# Patient Record
Sex: MALE | Race: BLACK OR AFRICAN AMERICAN
[De-identification: names, ages, dates, MRNs, and addresses within clinical notes are randomized per-mention and may not be internally consistent; named-entity substitution may affect disease eponyms.]

---

## 2018-03-22 ENCOUNTER — HOSPITAL ENCOUNTER (INPATIENT)
Dept: HOSPITAL 74 - YASAS | Age: 28
LOS: 25 days | Discharge: HOME | DRG: 772 | End: 2018-04-16
Attending: PSYCHIATRY & NEUROLOGY | Admitting: PSYCHIATRY & NEUROLOGY
Payer: COMMERCIAL

## 2018-03-22 VITALS — BODY MASS INDEX: 33 KG/M2

## 2018-03-22 DIAGNOSIS — F25.9: ICD-10-CM

## 2018-03-22 DIAGNOSIS — Z91.5: ICD-10-CM

## 2018-03-22 DIAGNOSIS — F19.24: ICD-10-CM

## 2018-03-22 DIAGNOSIS — F19.282: ICD-10-CM

## 2018-03-22 DIAGNOSIS — F12.20: Primary | ICD-10-CM

## 2018-03-22 DIAGNOSIS — L30.9: ICD-10-CM

## 2018-03-22 DIAGNOSIS — G47.9: ICD-10-CM

## 2018-03-22 DIAGNOSIS — J30.2: ICD-10-CM

## 2018-03-22 DIAGNOSIS — F43.10: ICD-10-CM

## 2018-03-22 LAB
APPEARANCE UR: (no result)
BILIRUB UR STRIP.AUTO-MCNC: NEGATIVE MG/DL
COLOR UR: YELLOW
KETONES UR QL STRIP: NEGATIVE
LEUKOCYTE ESTERASE UR QL STRIP.AUTO: NEGATIVE
NITRITE UR QL STRIP: NEGATIVE
PH UR: 5 [PH] (ref 5–8)
PROT UR QL STRIP: NEGATIVE
PROT UR QL STRIP: NEGATIVE
RBC # UR STRIP: NEGATIVE /UL
SP GR UR: 1.02 (ref 1–1.03)
UROBILINOGEN UR STRIP-MCNC: 2 MG/DL (ref 0.2–1)

## 2018-03-22 PROCEDURE — HZ42ZZZ GROUP COUNSELING FOR SUBSTANCE ABUSE TREATMENT, COGNITIVE-BEHAVIORAL: ICD-10-PCS | Performed by: PSYCHIATRY & NEUROLOGY

## 2018-03-22 PROCEDURE — G0008 ADMIN INFLUENZA VIRUS VAC: HCPCS

## 2018-03-22 PROCEDURE — G0009 ADMIN PNEUMOCOCCAL VACCINE: HCPCS

## 2018-03-22 RX ADMIN — Medication SCH MG: at 21:47

## 2018-03-22 NOTE — HP
Admission [facility]


Chief Complaint: 





I am here for rehab for marijuana 


Allergies/Adverse Reactions: 


 Allergies











Allergy/AdvReac Type Severity Reaction Status Date / Time


 


pork derived (porcine) Allergy Severe Vomiting Verified 18 16:15











History of Present Illness: 


 


28 yo male with male with history of marijuana dependence. Patient reports he 

was referred by his  Zhane Mosher from the [facility].  PMHX: 

depression and bipolar. Denies suicidal or homicidal ideation, reports hx of 

suicide attempt at age 16 by hanging. Last rehab at [facility] , reports has not 

attempted other treatment programs. Patient reports no aftercare plan none at 

this time.


Exam Limitations: No Limitations





- Ebola screening


Have you been sick,other than usual withdrawal symptoms: No


Do you have a fever: No





- Review of Systems


Constitutional: Changes in sleep


EENT: reports: No Symptoms Reported


Respiratory: reports: No Symptoms reported


Cardiac: reports: No Symptoms Reported


GI: reports: No Symptoms Reported


: reports: No Symptoms Reported


Musculoskeletal: reports: No Symptoms Reported


Integumentary: reports: No Symptoms Reported


Neuro: reports: No Symptoms reported


Endocrine: reports: Excessive Sweating


Hematology: reports: No Symptoms Reported


Psychiatric: reports: Orientated x3, Anxious


Other Systems: Reviewed and Negative





Patient History





- Patient Medical History


Hx Anemia: No


Hx Asthma: No


Hx Chronic Obstructive Pulmonary Disease (COPD): No


Hx Cancer: No


Hx Cardiac Disorders: No


Hx Congestive Heart Failure: No


Hx Hypertension: No


Hx Hypercholesterolemia: No


Hx Pacemaker: No


HX Cerebrovascular Accident: No


Hx Seizures: No


Hx Dementia: No


Hx Diabetes: No


Hx Gastrointestinal Disorders: No


Hx Liver Disease: No


Hx Genitourinary Disorders: No


Hx Sexually Transmitted Disorders: No


Hx Renal Disease (ESRD): No


Hx Thyroid Disease: No


Hx Human Immunodeficiency Virus (HIV): No


Hx Hepatitis C: No


Hx Depression: Yes


Hx Suicide Attempt: Yes (at age 16 )


Hx Bipolar Disorder: Yes


Hx Schizophrenia: No





- Patient Surgical History


Past Surgical History: No


Hx Neurologic Surgery: No


Hx Cataract Extraction: No


Hx Cardiac Surgery: No


Hx Lung Surgery: No


Hx Breast Surgery: No


Hx Breast Biopsy: No


Hx Abdominal Surgery: No


Hx Appendectomy: No


Hx Cholecystectomy: No


Hx Genitourinary Surgery: No


Hx  Section: No


Hx Orthopedic Surgery: No


Anesthesia Reaction: No





- PPD History


Previous Implant?: Yes


Documented Results: Negative w/proof


Date: 14


PPD to be Administered?: Yes





- Reproductive History


Patient is a Female of Child Bearing Age (11 -55 yrs old): No





- Smoking Cessation


Smoking history: Former smoker


Have you smoked in the past 12 months: No


Cigars Per Day: 0


Hx Chewing Tobacco Use: No


Initiated information on smoking cessation: No





- Substance & Tx. History


Hx Alcohol Use: No


Hx Substance Use: Yes


Substance Use Type: Marijuana


Hx Substance Use Treatment: Yes ([facility] )





- Substances Abused


  ** Marijuana/Hashish


Route: Smoking


Frequency: Daily


Amount used: $50


Age of first use: 14


Date of Last Use: 18





Family Disease History





- Family Disease History


Family Disease History: Other: Father (alive and well ), Mother (alive, Lupus )





Admission Physical Exam [facility]





- Vital Signs


Vital Signs: 


 Vital Signs - 24 hr











  18





  14:26


 


Temperature 98.1 F


 


Pulse Rate 84


 


Respiratory 20





Rate 


 


Blood Pressure 124/76














- Physical


General Appearance: Yes: Nourished, Appropriately Dressed, Anxious


HEENTM: Yes: Normal ENT Inspection, Normocephalic, Normal Voice, Pharynx Normal

, Tm's normal


Respiratory: Yes: Chest Non-Tender, Normal Breath Sounds, No Respiratory 

Distress, No Accessory Muscle Use


Neck: Yes: No masses,lesions,Nodules, Trachea in good position


Breast: Yes: Breast Exam Deferred


Cardiology: Yes: Regular Rhythm, Regular Rate


Abdominal: Yes: Normal Bowel Sounds, Non Tender, Soft, Protuberent


Genitourinary: Yes: Within Normal Limits


Back: Yes: Normal Inspection


Musculoskeletal: Yes: full range of Motion, Gait Steady, Pelvis Stable


Extremities: Yes: Normal Capillary Refill, Normal Inspection, Normal Range of 

Motion, Non-Tender


Neurological: Yes: CNs II-XII NML intact, Fully Oriented, Alert, Motor Strength 

5/5, Other (flat affect)


Integumentary: Yes: Normal Color, Dry, Warm


Lymphatic: Yes: Within Normal Limits





- Diagnostic


(1) Psychiatric disorder


Current Visit: Yes   Status: Suspected   





(2) Eczematous dermatitis


Current Visit: Yes   Status: Chronic   


Qualifiers: 


   Eczema type: unspecified   Qualified Code(s): L30.9 - Dermatitis, 

unspecified   





(3) Marihuana dependence


Current Visit: Yes   Status: Chronic   





(4) Seasonal allergies


Current Visit: Yes   Status: Chronic   


Qualifiers: 


   Allergic rhinitis trigger: unspecified   Qualified Code(s): J30.2 - Other 

seasonal allergic rhinitis   





(5) Difficulty sleeping


Current Visit: Yes   Status: Acute   





[facility]HS Breath Alcohol Content


Breath Alcohol Content: 0





Urine Drug Screen





- Results


Drug Screen Negative: No


Urine Drug Screen Results: THC-Marijuana





Inpatient Rehab Admission





- Initial Determination


Are CD services needed?: Yes


Free of communicable disease: Yes


Not in need of hospitalization: Yes





- Rehab Admission Criteria


Previous failed treatment: Yes


Poor recovery environment: Yes


Comorbidities: Yes


Lacks judgement: Yes


Patient is meeting Inpatient Rehab admission criteria:: Yes

## 2018-03-22 NOTE — PN
[facility] Progress Note


Note: 





Psychiatric Nurse practitioner:


Writer informed by RN on 3W that patient will be admitted to unit soon. Pt. 

with a history of bipolar disorder. Writer is on call this evening and was able 

to speak to patient in [facility] concerning his medication regime. Pt. reports taking 

seroquel 200mg qhs. Pharmacy claims reviewed. Pt. is compliant with his 

medication. Pt. appears to be reliable. Verbal consent given.  Seroquel 200mg 

qhs ordered.

## 2018-03-23 LAB
ALBUMIN SERPL-MCNC: 4.1 G/DL (ref 3.4–5)
ALP SERPL-CCNC: 82 U/L (ref 45–117)
ALT SERPL-CCNC: 45 U/L (ref 12–78)
ANION GAP SERPL CALC-SCNC: 10 MMOL/L (ref 8–16)
AST SERPL-CCNC: 26 U/L (ref 15–37)
BILIRUB SERPL-MCNC: 1 MG/DL (ref 0.2–1)
BUN SERPL-MCNC: 12 MG/DL (ref 7–18)
CALCIUM SERPL-MCNC: 9.3 MG/DL (ref 8.5–10.1)
CHLORIDE SERPL-SCNC: 102 MMOL/L (ref 98–107)
CO2 SERPL-SCNC: 28 MMOL/L (ref 21–32)
CREAT SERPL-MCNC: 0.9 MG/DL (ref 0.7–1.3)
DEPRECATED RDW RBC AUTO: 13.9 % (ref 11.9–15.9)
GLUCOSE SERPL-MCNC: 92 MG/DL (ref 74–106)
HCT VFR BLD CALC: 40.6 % (ref 35.4–49)
HGB BLD-MCNC: 13.3 GM/DL (ref 11.7–16.9)
MCH RBC QN AUTO: 27.3 PG (ref 25.7–33.7)
MCHC RBC AUTO-ENTMCNC: 32.7 G/DL (ref 32–35.9)
MCV RBC: 83.3 FL (ref 80–96)
PLATELET # BLD AUTO: 235 K/MM3 (ref 134–434)
PMV BLD: 8.4 FL (ref 7.5–11.1)
POTASSIUM SERPLBLD-SCNC: 4.4 MMOL/L (ref 3.5–5.1)
PROT SERPL-MCNC: 8 G/DL (ref 6.4–8.2)
RBC # BLD AUTO: 4.88 M/MM3 (ref 4–5.6)
SODIUM SERPL-SCNC: 140 MMOL/L (ref 136–145)
WBC # BLD AUTO: 3 K/MM3 (ref 4–10)

## 2018-03-23 RX ADMIN — Medication SCH TAB: at 10:43

## 2018-03-23 RX ADMIN — Medication SCH MG: at 21:45
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
## 2018-03-23 NOTE — EKG
Test Reason : 

Blood Pressure : ***/*** mmHG

Vent. Rate : 070 BPM     Atrial Rate : 070 BPM

   P-R Int : 146 ms          QRS Dur : 082 ms

    QT Int : 400 ms       P-R-T Axes : 037 082 040 degrees

   QTc Int : 432 ms

 

NORMAL SINUS RHYTHM

NORMAL ECG

NO PREVIOUS ECGS AVAILABLE

Confirmed by MEHUL GIBSON MD ([id number]) on 3/23/2018 10:01:13 AM

 

Referred By:             Confirmed By:MEHUL GIBSON MD

## 2018-03-24 RX ADMIN — Medication SCH MG: at 22:03

## 2018-03-24 RX ADMIN — Medication SCH TAB: at 10:14

## 2018-03-24 RX ADMIN — PSEUDOEPHEDRINE HCL AND TRIPROLIDINE HCL PRN COMBO: 60; 2.5 TABLET, FILM COATED ORAL at 22:04

## 2018-03-24 RX ADMIN — PSEUDOEPHEDRINE HCL AND TRIPROLIDINE HCL PRN COMBO: 60; 2.5 TABLET, FILM COATED ORAL at 15:00

## 2018-03-25 RX ADMIN — PSEUDOEPHEDRINE HCL AND TRIPROLIDINE HCL PRN COMBO: 60; 2.5 TABLET, FILM COATED ORAL at 21:44

## 2018-03-25 RX ADMIN — Medication SCH TAB: at 10:09

## 2018-03-25 RX ADMIN — Medication SCH MG: at 21:43

## 2018-03-25 RX ADMIN — PSEUDOEPHEDRINE HCL AND TRIPROLIDINE HCL PRN COMBO: 60; 2.5 TABLET, FILM COATED ORAL at 10:10

## 2018-03-26 RX ADMIN — Medication SCH MG: at 21:46

## 2018-03-26 RX ADMIN — Medication SCH TAB: at 10:39

## 2018-03-26 RX ADMIN — PSEUDOEPHEDRINE HCL AND TRIPROLIDINE HCL PRN COMBO: 60; 2.5 TABLET, FILM COATED ORAL at 21:47

## 2018-03-27 RX ADMIN — PSEUDOEPHEDRINE HCL AND TRIPROLIDINE HCL PRN COMBO: 60; 2.5 TABLET, FILM COATED ORAL at 21:50

## 2018-03-27 RX ADMIN — Medication SCH MG: at 21:49

## 2018-03-27 RX ADMIN — Medication SCH TAB: at 10:24

## 2018-03-27 RX ADMIN — GLYCOPYRROLATE SCH MG: 1 TABLET ORAL at 20:30

## 2018-03-28 RX ADMIN — Medication SCH MG: at 21:47

## 2018-03-28 RX ADMIN — GLYCOPYRROLATE SCH MG: 1 TABLET ORAL at 17:03

## 2018-03-28 RX ADMIN — GLYCOPYRROLATE SCH MG: 1 TABLET ORAL at 07:32

## 2018-03-28 RX ADMIN — PSEUDOEPHEDRINE HCL AND TRIPROLIDINE HCL PRN COMBO: 60; 2.5 TABLET, FILM COATED ORAL at 21:47

## 2018-03-28 RX ADMIN — Medication SCH TAB: at 10:19

## 2018-03-29 RX ADMIN — Medication SCH MG: at 21:57

## 2018-03-29 RX ADMIN — GLYCOPYRROLATE SCH MG: 1 TABLET ORAL at 07:00

## 2018-03-29 RX ADMIN — Medication SCH TAB: at 10:44

## 2018-03-29 RX ADMIN — PSEUDOEPHEDRINE HCL AND TRIPROLIDINE HCL PRN COMBO: 60; 2.5 TABLET, FILM COATED ORAL at 18:27

## 2018-03-29 RX ADMIN — PSEUDOEPHEDRINE HCL AND TRIPROLIDINE HCL PRN COMBO: 60; 2.5 TABLET, FILM COATED ORAL at 06:32

## 2018-03-29 RX ADMIN — GLYCOPYRROLATE SCH MG: 1 TABLET ORAL at 18:26

## 2018-03-30 RX ADMIN — Medication SCH TAB: at 10:29

## 2018-03-30 RX ADMIN — GLYCOPYRROLATE SCH MG: 1 TABLET ORAL at 07:05

## 2018-03-30 RX ADMIN — Medication SCH MG: at 22:01

## 2018-03-30 RX ADMIN — GLYCOPYRROLATE SCH MG: 1 TABLET ORAL at 17:02

## 2018-03-30 RX ADMIN — PSEUDOEPHEDRINE HCL AND TRIPROLIDINE HCL PRN COMBO: 60; 2.5 TABLET, FILM COATED ORAL at 06:34

## 2018-03-30 RX ADMIN — PSEUDOEPHEDRINE HCL AND TRIPROLIDINE HCL PRN COMBO: 60; 2.5 TABLET, FILM COATED ORAL at 22:02

## 2018-03-31 RX ADMIN — PSEUDOEPHEDRINE HCL AND TRIPROLIDINE HCL PRN COMBO: 60; 2.5 TABLET, FILM COATED ORAL at 07:20

## 2018-03-31 RX ADMIN — Medication SCH MG: at 21:49

## 2018-03-31 RX ADMIN — GLYCOPYRROLATE SCH MG: 1 TABLET ORAL at 16:56

## 2018-03-31 RX ADMIN — Medication SCH TAB: at 10:09

## 2018-03-31 RX ADMIN — GLYCOPYRROLATE SCH MG: 1 TABLET ORAL at 07:19

## 2018-03-31 RX ADMIN — PSEUDOEPHEDRINE HCL AND TRIPROLIDINE HCL PRN COMBO: 60; 2.5 TABLET, FILM COATED ORAL at 21:51

## 2018-04-01 RX ADMIN — Medication SCH MG: at 21:45

## 2018-04-01 RX ADMIN — Medication SCH TAB: at 10:18

## 2018-04-01 RX ADMIN — GLYCOPYRROLATE SCH MG: 1 TABLET ORAL at 16:54

## 2018-04-01 RX ADMIN — GLYCOPYRROLATE SCH MG: 1 TABLET ORAL at 07:11

## 2018-04-01 RX ADMIN — PSEUDOEPHEDRINE HCL AND TRIPROLIDINE HCL PRN COMBO: 60; 2.5 TABLET, FILM COATED ORAL at 21:47

## 2018-04-01 RX ADMIN — Medication SCH MG: at 21:46

## 2018-04-01 RX ADMIN — PSEUDOEPHEDRINE HCL AND TRIPROLIDINE HCL PRN COMBO: 60; 2.5 TABLET, FILM COATED ORAL at 06:42

## 2018-04-02 RX ADMIN — Medication SCH MG: at 21:40

## 2018-04-02 RX ADMIN — PSEUDOEPHEDRINE HCL AND TRIPROLIDINE HCL PRN COMBO: 60; 2.5 TABLET, FILM COATED ORAL at 21:41

## 2018-04-02 RX ADMIN — PSEUDOEPHEDRINE HCL AND TRIPROLIDINE HCL PRN COMBO: 60; 2.5 TABLET, FILM COATED ORAL at 06:35

## 2018-04-02 RX ADMIN — Medication SCH TAB: at 10:31

## 2018-04-02 RX ADMIN — GLYCOPYRROLATE SCH MG: 1 TABLET ORAL at 07:22

## 2018-04-02 RX ADMIN — GLYCOPYRROLATE SCH MG: 1 TABLET ORAL at 17:02

## 2018-04-03 RX ADMIN — GLYCOPYRROLATE SCH MG: 1 TABLET ORAL at 07:03

## 2018-04-03 RX ADMIN — Medication SCH MG: at 21:56

## 2018-04-03 RX ADMIN — LORATADINE SCH MG: 10 TABLET ORAL at 15:07

## 2018-04-03 RX ADMIN — GLYCOPYRROLATE SCH MG: 1 TABLET ORAL at 18:00

## 2018-04-03 RX ADMIN — Medication SCH TAB: at 10:16

## 2018-04-03 RX ADMIN — PSEUDOEPHEDRINE HCL AND TRIPROLIDINE HCL PRN COMBO: 60; 2.5 TABLET, FILM COATED ORAL at 06:41

## 2018-04-03 NOTE — PN
[facility] Progress Note


Note: 





Patient c/o nasal congestion and hx of seasonal allergies Vital Signs











Temperature  97.9 F   04/03/18 07:13


 


Pulse Rate  104 H  04/03/18 07:13


 


Respiratory Rate  18   04/03/18 07:13


 


Blood Pressure  137/72   04/03/18 07:13


 


O2 Sat by Pulse Oximetry (%)      








 Laboratory Last Values











WBC  3.0 K/mm3 (4.0-10.0)  L  03/23/18  07:25    


 


RBC  4.88 M/mm3 (4.00-5.60)   03/23/18  07:25    


 


Hgb  13.3 GM/dL (11.7-16.9)   03/23/18  07:25    


 


Hct  40.6 % (35.4-49)   03/23/18  07:25    


 


MCV  83.3 fl (80-96)   03/23/18  07:25    


 


MCH  27.3 pg (25.7-33.7)   03/23/18  07:25    


 


MCHC  32.7 g/dl (32.0-35.9)   03/23/18  07:25    


 


RDW  13.9 % (11.9-15.9)   03/23/18  07:25    


 


Plt Count  235 K/MM3 (134-434)   03/23/18  07:25    


 


MPV  8.4 fl (7.5-11.1)   03/23/18  07:25    


 


Sodium  140 mmol/L (136-145)   03/23/18  07:25    


 


Potassium  4.4 mmol/L (3.5-5.1)   03/23/18  07:25    


 


Chloride  102 mmol/L ()   03/23/18  07:25    


 


Carbon Dioxide  28 mmol/L (21-32)   03/23/18  07:25    


 


Anion Gap  10  (8-16)   03/23/18  07:25    


 


BUN  12 mg/dL (7-18)  D 03/23/18  07:25    


 


Creatinine  0.9 mg/dL (0.7-1.3)   03/23/18  07:25    


 


Creat Clearance w eGFR  > 60  (>60)   03/23/18  07:25    


 


Random Glucose  92 mg/dL ()   03/23/18  07:25    


 


Calcium  9.3 mg/dL (8.5-10.1)   03/23/18  07:25    


 


Total Bilirubin  1.0 mg/dL (0.2-1.0)  D 03/23/18  07:25    


 


AST  26 U/L (15-37)  D 03/23/18  07:25    


 


ALT  45 U/L (12-78)  D 03/23/18  07:25    


 


Alkaline Phosphatase  82 U/L ()  D 03/23/18  07:25    


 


Total Protein  8.0 g/dl (6.4-8.2)   03/23/18  07:25    


 


Albumin  4.1 g/dl (3.4-5.0)   03/23/18  07:25    


 


Urine Color  Yellow   03/22/18  20:21    


 


Urine Appearance  Slcloudy   03/22/18  20:21    


 


Urine pH  5.0  (5.0-8.0)   03/22/18  20:21    


 


Ur Specific Gravity  1.025  (1.001-1.035)   03/22/18  20:21    


 


Urine Protein  Negative  (NEGATIVE)   03/22/18  20:21    


 


Urine Glucose (UA)  Negative  (NEGATIVE)   03/22/18  20:21    


 


Urine Ketones  Negative  (NEGATIVE)   03/22/18  20:21    


 


Urine Blood  Negative  (NEGATIVE)   03/22/18  20:21    


 


Urine Nitrite  Negative  (NEGATIVE)   03/22/18  20:21    


 


Urine Bilirubin  Negative  (NEGATIVE)   03/22/18  20:21    


 


Urine Urobilinogen  2.0 mg/dL (0.2-1.0)   03/22/18  20:21    


 


Ur Leukocyte Esterase  Negative  (NEGATIVE)   03/22/18  20:21    


 


RPR Titer  Nonreactive  (NONREACTIVE)   03/23/18  07:25    


 


HIV 1&2 Antibody Screen  Negative   03/23/18  07:00    


 


HIV P24 Antigen  Negative   03/23/18  07:00    








+ rhinorrhea and nasal congestion


No SOB or distress 


normal HR and rhythm








Plan:


Start trial of claritin PO Daily 


Increase fluids 


Continue to monitor

## 2018-04-04 RX ADMIN — LORATADINE SCH MG: 10 TABLET ORAL at 10:34

## 2018-04-04 RX ADMIN — GLYCOPYRROLATE SCH MG: 1 TABLET ORAL at 17:00

## 2018-04-04 RX ADMIN — Medication SCH MG: at 21:51

## 2018-04-04 RX ADMIN — GLYCOPYRROLATE SCH MG: 1 TABLET ORAL at 07:39

## 2018-04-04 RX ADMIN — Medication SCH TAB: at 10:34

## 2018-04-05 RX ADMIN — GLYCOPYRROLATE SCH MG: 1 TABLET ORAL at 07:17

## 2018-04-05 RX ADMIN — Medication SCH TAB: at 10:33

## 2018-04-05 RX ADMIN — Medication SCH MG: at 21:47

## 2018-04-05 RX ADMIN — GLYCOPYRROLATE SCH MG: 1 TABLET ORAL at 16:58

## 2018-04-05 RX ADMIN — LORATADINE SCH MG: 10 TABLET ORAL at 10:33

## 2018-04-06 RX ADMIN — Medication SCH MG: at 21:18

## 2018-04-06 RX ADMIN — LORATADINE SCH MG: 10 TABLET ORAL at 10:36

## 2018-04-06 RX ADMIN — GLYCOPYRROLATE SCH MG: 1 TABLET ORAL at 07:09

## 2018-04-06 RX ADMIN — Medication SCH TAB: at 10:36

## 2018-04-06 RX ADMIN — GLYCOPYRROLATE SCH MG: 1 TABLET ORAL at 16:57

## 2018-04-06 NOTE — PN
[facility] Progress Note


Note: 





Pt complains of nasal stuffiness. Denies fever, headache and cough. Will order 

flonase nasal spray. Continue to monitor.

## 2018-04-07 RX ADMIN — Medication SCH: at 22:10

## 2018-04-07 RX ADMIN — Medication SCH MG: at 22:09

## 2018-04-07 RX ADMIN — Medication SCH TAB: at 10:05

## 2018-04-07 RX ADMIN — FLUTICASONE PROPIONATE SCH SPRAYS: 50 SPRAY, METERED NASAL at 10:05

## 2018-04-07 RX ADMIN — GLYCOPYRROLATE SCH MG: 1 TABLET ORAL at 17:10

## 2018-04-07 RX ADMIN — GLYCOPYRROLATE SCH MG: 1 TABLET ORAL at 07:00

## 2018-04-07 RX ADMIN — LORATADINE SCH MG: 10 TABLET ORAL at 10:05

## 2018-04-08 RX ADMIN — Medication SCH TAB: at 10:20

## 2018-04-08 RX ADMIN — Medication SCH MG: at 22:00

## 2018-04-08 RX ADMIN — LORATADINE SCH MG: 10 TABLET ORAL at 10:20

## 2018-04-08 RX ADMIN — GLYCOPYRROLATE SCH MG: 1 TABLET ORAL at 07:03

## 2018-04-08 RX ADMIN — GLYCOPYRROLATE SCH MG: 1 TABLET ORAL at 17:52

## 2018-04-08 RX ADMIN — FLUTICASONE PROPIONATE SCH SPRAYS: 50 SPRAY, METERED NASAL at 10:19

## 2018-04-09 RX ADMIN — Medication SCH MG: at 21:32

## 2018-04-09 RX ADMIN — Medication SCH TAB: at 10:44

## 2018-04-09 RX ADMIN — GLYCOPYRROLATE SCH MG: 1 TABLET ORAL at 16:45

## 2018-04-09 RX ADMIN — FLUTICASONE PROPIONATE SCH SPRAYS: 50 SPRAY, METERED NASAL at 10:45

## 2018-04-09 RX ADMIN — LORATADINE SCH MG: 10 TABLET ORAL at 10:44

## 2018-04-09 RX ADMIN — GLYCOPYRROLATE SCH MG: 1 TABLET ORAL at 07:11

## 2018-04-10 RX ADMIN — Medication SCH EACH: at 21:15

## 2018-04-10 RX ADMIN — GLYCOPYRROLATE SCH MG: 1 TABLET ORAL at 07:15

## 2018-04-10 RX ADMIN — CYCLOBENZAPRINE HYDROCHLORIDE SCH MG: 5 TABLET, FILM COATED ORAL at 17:05

## 2018-04-10 RX ADMIN — Medication SCH TAB: at 09:49

## 2018-04-10 RX ADMIN — LORATADINE SCH MG: 10 TABLET ORAL at 09:49

## 2018-04-10 RX ADMIN — Medication SCH MG: at 21:15

## 2018-04-10 RX ADMIN — Medication SCH MG: at 21:14

## 2018-04-10 RX ADMIN — GLYCOPYRROLATE SCH MG: 1 TABLET ORAL at 17:05

## 2018-04-10 RX ADMIN — CYCLOBENZAPRINE HYDROCHLORIDE SCH MG: 5 TABLET, FILM COATED ORAL at 21:14

## 2018-04-10 RX ADMIN — FLUTICASONE PROPIONATE SCH SPRAYS: 50 SPRAY, METERED NASAL at 09:49

## 2018-04-10 RX ADMIN — LIDOCAINE SCH PATCH: 50 PATCH TOPICAL at 17:05

## 2018-04-10 NOTE — PN
[facility] Progress Note


Note: 





Patient c/o of l;ow back pain x 3 days, pain on the bottom of his right foot. 

Patient denies any chest pain, SOB, paresthesia or urinary symptoms. 





 Vital Signs











Temperature  98.0 F   04/10/18 07:05


 


Pulse Rate  95 H  04/10/18 07:05


 


Respiratory Rate  18   04/10/18 07:05


 


Blood Pressure  133/76   04/10/18 07:05


 


O2 Sat by Pulse Oximetry (%)      








 Laboratory Last Values











WBC  3.0 K/mm3 (4.0-10.0)  L  03/23/18  07:25    


 


RBC  4.88 M/mm3 (4.00-5.60)   03/23/18  07:25    


 


Hgb  13.3 GM/dL (11.7-16.9)   03/23/18  07:25    


 


Hct  40.6 % (35.4-49)   03/23/18  07:25    


 


MCV  83.3 fl (80-96)   03/23/18  07:25    


 


MCH  27.3 pg (25.7-33.7)   03/23/18  07:25    


 


MCHC  32.7 g/dl (32.0-35.9)   03/23/18  07:25    


 


RDW  13.9 % (11.9-15.9)   03/23/18  07:25    


 


Plt Count  235 K/MM3 (134-434)   03/23/18  07:25    


 


MPV  8.4 fl (7.5-11.1)   03/23/18  07:25    


 


Sodium  140 mmol/L (136-145)   03/23/18  07:25    


 


Potassium  4.4 mmol/L (3.5-5.1)   03/23/18  07:25    


 


Chloride  102 mmol/L ()   03/23/18  07:25    


 


Carbon Dioxide  28 mmol/L (21-32)   03/23/18  07:25    


 


Anion Gap  10  (8-16)   03/23/18  07:25    


 


BUN  12 mg/dL (7-18)  D 03/23/18  07:25    


 


Creatinine  0.9 mg/dL (0.7-1.3)   03/23/18  07:25    


 


Creat Clearance w eGFR  > 60  (>60)   03/23/18  07:25    


 


Random Glucose  92 mg/dL ()   03/23/18  07:25    


 


Calcium  9.3 mg/dL (8.5-10.1)   03/23/18  07:25    


 


Total Bilirubin  1.0 mg/dL (0.2-1.0)  D 03/23/18  07:25    


 


AST  26 U/L (15-37)  D 03/23/18  07:25    


 


ALT  45 U/L (12-78)  D 03/23/18  07:25    


 


Alkaline Phosphatase  82 U/L ()  D 03/23/18  07:25    


 


Total Protein  8.0 g/dl (6.4-8.2)   03/23/18  07:25    


 


Albumin  4.1 g/dl (3.4-5.0)   03/23/18  07:25    


 


Urine Color  Yellow   03/22/18  20:21    


 


Urine Appearance  Slcloudy   03/22/18  20:21    


 


Urine pH  5.0  (5.0-8.0)   03/22/18  20:21    


 


Ur Specific Gravity  1.025  (1.001-1.035)   03/22/18  20:21    


 


Urine Protein  Negative  (NEGATIVE)   03/22/18  20:21    


 


Urine Glucose (UA)  Negative  (NEGATIVE)   03/22/18  20:21    


 


Urine Ketones  Negative  (NEGATIVE)   03/22/18  20:21    


 


Urine Blood  Negative  (NEGATIVE)   03/22/18  20:21    


 


Urine Nitrite  Negative  (NEGATIVE)   03/22/18  20:21    


 


Urine Bilirubin  Negative  (NEGATIVE)   03/22/18  20:21    


 


Urine Urobilinogen  2.0 mg/dL (0.2-1.0)   03/22/18  20:21    


 


Ur Leukocyte Esterase  Negative  (NEGATIVE)   03/22/18  20:21    


 


RPR Titer  Nonreactive  (NONREACTIVE)   03/23/18  07:25    


 


HIV 1&2 Antibody Screen  Negative   03/23/18  07:00    


 


HIV P24 Antigen  Negative   03/23/18  07:00    








A/P 


Patient AOx3, no apparent distress, with mild anxiety 


Normal heart rate and rythmn 


No adventicious breath sounds 


+ low back pain 


+ pain on bottom of right foot , + callus 








Plan: 


Flexeril 5mg TID PRN 


Lidocaine patch 


Increase fluids 


Continue to monitor

## 2018-04-11 RX ADMIN — FLUTICASONE PROPIONATE SCH SPRAYS: 50 SPRAY, METERED NASAL at 10:14

## 2018-04-11 RX ADMIN — Medication SCH EACH: at 21:19

## 2018-04-11 RX ADMIN — LORATADINE SCH MG: 10 TABLET ORAL at 10:14

## 2018-04-11 RX ADMIN — GLYCOPYRROLATE SCH MG: 1 TABLET ORAL at 07:15

## 2018-04-11 RX ADMIN — CYCLOBENZAPRINE HYDROCHLORIDE SCH MG: 5 TABLET, FILM COATED ORAL at 21:18

## 2018-04-11 RX ADMIN — GLYCOPYRROLATE SCH MG: 1 TABLET ORAL at 17:01

## 2018-04-11 RX ADMIN — CYCLOBENZAPRINE HYDROCHLORIDE SCH MG: 5 TABLET, FILM COATED ORAL at 14:25

## 2018-04-11 RX ADMIN — Medication SCH TAB: at 10:14

## 2018-04-11 RX ADMIN — Medication SCH MG: at 21:18

## 2018-04-11 RX ADMIN — LIDOCAINE SCH PATCH: 50 PATCH TOPICAL at 10:15

## 2018-04-11 RX ADMIN — CYCLOBENZAPRINE HYDROCHLORIDE SCH MG: 5 TABLET, FILM COATED ORAL at 06:12

## 2018-04-12 RX ADMIN — CYCLOBENZAPRINE HYDROCHLORIDE SCH MG: 5 TABLET, FILM COATED ORAL at 21:21

## 2018-04-12 RX ADMIN — Medication SCH MG: at 21:21

## 2018-04-12 RX ADMIN — GLYCOPYRROLATE SCH MG: 1 TABLET ORAL at 07:17

## 2018-04-12 RX ADMIN — GLYCOPYRROLATE SCH MG: 1 TABLET ORAL at 17:15

## 2018-04-12 RX ADMIN — CYCLOBENZAPRINE HYDROCHLORIDE SCH MG: 5 TABLET, FILM COATED ORAL at 06:14

## 2018-04-12 RX ADMIN — LORATADINE SCH MG: 10 TABLET ORAL at 09:52

## 2018-04-12 RX ADMIN — LIDOCAINE SCH PATCH: 50 PATCH TOPICAL at 09:52

## 2018-04-12 RX ADMIN — CYCLOBENZAPRINE HYDROCHLORIDE SCH MG: 5 TABLET, FILM COATED ORAL at 14:32

## 2018-04-12 RX ADMIN — FLUTICASONE PROPIONATE SCH SPRAYS: 50 SPRAY, METERED NASAL at 09:52

## 2018-04-12 RX ADMIN — Medication SCH TAB: at 09:52

## 2018-04-12 RX ADMIN — Medication SCH EACH: at 21:22

## 2018-04-13 RX ADMIN — GLYCOPYRROLATE SCH MG: 1 TABLET ORAL at 17:00

## 2018-04-13 RX ADMIN — Medication SCH TAB: at 09:49

## 2018-04-13 RX ADMIN — Medication SCH EACH: at 21:12

## 2018-04-13 RX ADMIN — FLUTICASONE PROPIONATE SCH SPRAYS: 50 SPRAY, METERED NASAL at 09:52

## 2018-04-13 RX ADMIN — GLYCOPYRROLATE SCH MG: 1 TABLET ORAL at 07:03

## 2018-04-13 RX ADMIN — LIDOCAINE SCH PATCH: 50 PATCH TOPICAL at 09:50

## 2018-04-13 RX ADMIN — Medication SCH MG: at 21:11

## 2018-04-13 RX ADMIN — Medication SCH MG: at 21:12

## 2018-04-13 RX ADMIN — CYCLOBENZAPRINE HYDROCHLORIDE SCH MG: 5 TABLET, FILM COATED ORAL at 06:14

## 2018-04-13 RX ADMIN — CYCLOBENZAPRINE HYDROCHLORIDE SCH MG: 5 TABLET, FILM COATED ORAL at 14:23

## 2018-04-13 RX ADMIN — LORATADINE SCH MG: 10 TABLET ORAL at 09:49

## 2018-04-13 RX ADMIN — CYCLOBENZAPRINE HYDROCHLORIDE SCH MG: 5 TABLET, FILM COATED ORAL at 21:12

## 2018-04-14 RX ADMIN — Medication SCH MG: at 21:17

## 2018-04-14 RX ADMIN — GLYCOPYRROLATE SCH MG: 1 TABLET ORAL at 16:57

## 2018-04-14 RX ADMIN — Medication SCH TAB: at 09:44

## 2018-04-14 RX ADMIN — CYCLOBENZAPRINE HYDROCHLORIDE SCH MG: 5 TABLET, FILM COATED ORAL at 14:04

## 2018-04-14 RX ADMIN — FLUTICASONE PROPIONATE SCH SPRAYS: 50 SPRAY, METERED NASAL at 09:44

## 2018-04-14 RX ADMIN — CYCLOBENZAPRINE HYDROCHLORIDE SCH MG: 5 TABLET, FILM COATED ORAL at 21:17

## 2018-04-14 RX ADMIN — LIDOCAINE SCH PATCH: 50 PATCH TOPICAL at 09:45

## 2018-04-14 RX ADMIN — Medication SCH EACH: at 21:18

## 2018-04-14 RX ADMIN — CYCLOBENZAPRINE HYDROCHLORIDE SCH MG: 5 TABLET, FILM COATED ORAL at 06:25

## 2018-04-14 RX ADMIN — LORATADINE SCH MG: 10 TABLET ORAL at 09:44

## 2018-04-14 RX ADMIN — GLYCOPYRROLATE SCH MG: 1 TABLET ORAL at 07:11

## 2018-04-15 VITALS — TEMPERATURE: 97.6 F

## 2018-04-15 RX ADMIN — Medication SCH EACH: at 21:09

## 2018-04-15 RX ADMIN — LIDOCAINE SCH PATCH: 50 PATCH TOPICAL at 09:37

## 2018-04-15 RX ADMIN — FLUTICASONE PROPIONATE SCH SPRAYS: 50 SPRAY, METERED NASAL at 09:37

## 2018-04-15 RX ADMIN — Medication SCH MG: at 21:08

## 2018-04-15 RX ADMIN — CYCLOBENZAPRINE HYDROCHLORIDE SCH MG: 5 TABLET, FILM COATED ORAL at 21:08

## 2018-04-15 RX ADMIN — GLYCOPYRROLATE SCH MG: 1 TABLET ORAL at 16:59

## 2018-04-15 RX ADMIN — CYCLOBENZAPRINE HYDROCHLORIDE SCH MG: 5 TABLET, FILM COATED ORAL at 14:20

## 2018-04-15 RX ADMIN — GLYCOPYRROLATE SCH MG: 1 TABLET ORAL at 07:11

## 2018-04-15 RX ADMIN — LORATADINE SCH MG: 10 TABLET ORAL at 09:37

## 2018-04-15 RX ADMIN — Medication SCH TAB: at 09:37

## 2018-04-15 RX ADMIN — CYCLOBENZAPRINE HYDROCHLORIDE SCH MG: 5 TABLET, FILM COATED ORAL at 06:16

## 2018-04-16 VITALS — HEART RATE: 94 BPM | SYSTOLIC BLOOD PRESSURE: 133 MMHG | DIASTOLIC BLOOD PRESSURE: 75 MMHG

## 2018-04-16 RX ADMIN — LORATADINE SCH MG: 10 TABLET ORAL at 09:44

## 2018-04-16 RX ADMIN — CYCLOBENZAPRINE HYDROCHLORIDE SCH MG: 5 TABLET, FILM COATED ORAL at 06:19

## 2018-04-16 RX ADMIN — LIDOCAINE SCH PATCH: 50 PATCH TOPICAL at 09:44

## 2018-04-16 RX ADMIN — GLYCOPYRROLATE SCH MG: 1 TABLET ORAL at 07:03

## 2018-04-16 RX ADMIN — FLUTICASONE PROPIONATE SCH SPRAYS: 50 SPRAY, METERED NASAL at 09:44

## 2018-04-16 RX ADMIN — Medication SCH TAB: at 09:44
